# Patient Record
Sex: FEMALE | Race: WHITE | Employment: STUDENT | URBAN - METROPOLITAN AREA
[De-identification: names, ages, dates, MRNs, and addresses within clinical notes are randomized per-mention and may not be internally consistent; named-entity substitution may affect disease eponyms.]

---

## 2019-01-30 ENCOUNTER — HOSPITAL ENCOUNTER (EMERGENCY)
Facility: CLINIC | Age: 24
Discharge: HOME | End: 2019-01-30
Attending: EMERGENCY MEDICINE | Admitting: EMERGENCY MEDICINE
Payer: COMMERCIAL

## 2019-01-30 VITALS
SYSTOLIC BLOOD PRESSURE: 122 MMHG | OXYGEN SATURATION: 98 % | DIASTOLIC BLOOD PRESSURE: 84 MMHG | RESPIRATION RATE: 16 BRPM | HEART RATE: 75 BPM | TEMPERATURE: 98.2 F

## 2019-01-30 DIAGNOSIS — T78.40XA ALLERGIC REACTION, INITIAL ENCOUNTER: ICD-10-CM

## 2019-01-30 PROCEDURE — 99284 EMERGENCY DEPT VISIT MOD MDM: CPT | Mod: Z6 | Performed by: EMERGENCY MEDICINE

## 2019-01-30 PROCEDURE — 99283 EMERGENCY DEPT VISIT LOW MDM: CPT | Performed by: EMERGENCY MEDICINE

## 2019-01-30 PROCEDURE — 25000125 ZZHC RX 250: Performed by: EMERGENCY MEDICINE

## 2019-01-30 PROCEDURE — 25000132 ZZH RX MED GY IP 250 OP 250 PS 637: Performed by: EMERGENCY MEDICINE

## 2019-01-30 RX ORDER — EPINEPHRINE 0.3 MG/.3ML
0.3 INJECTION SUBCUTANEOUS
Qty: 0.3 ML | Refills: 0 | Status: SHIPPED | OUTPATIENT
Start: 2019-01-30 | End: 2019-03-01

## 2019-01-30 RX ORDER — FAMOTIDINE 20 MG/1
20 TABLET, FILM COATED ORAL 2 TIMES DAILY
Qty: 20 TABLET | Refills: 0 | Status: SHIPPED | OUTPATIENT
Start: 2019-01-30 | End: 2019-02-12

## 2019-01-30 RX ORDER — DIPHENHYDRAMINE HCL 25 MG
25 CAPSULE ORAL EVERY 6 HOURS PRN
Qty: 20 CAPSULE | Refills: 0 | Status: SHIPPED | OUTPATIENT
Start: 2019-01-30 | End: 2019-02-04

## 2019-01-30 RX ORDER — CETIRIZINE HYDROCHLORIDE 10 MG/1
10 TABLET ORAL DAILY
Qty: 10 TABLET | Refills: 0 | Status: SHIPPED | OUTPATIENT
Start: 2019-01-30 | End: 2019-02-12

## 2019-01-30 RX ORDER — DIPHENHYDRAMINE HCL 50 MG
50 CAPSULE ORAL ONCE
Status: COMPLETED | OUTPATIENT
Start: 2019-01-30 | End: 2019-01-30

## 2019-01-30 RX ORDER — CETIRIZINE HYDROCHLORIDE 10 MG/1
10 TABLET ORAL DAILY
Status: DISCONTINUED | OUTPATIENT
Start: 2019-01-30 | End: 2019-01-30 | Stop reason: HOSPADM

## 2019-01-30 RX ORDER — PREDNISONE 20 MG/1
40 TABLET ORAL ONCE
Status: COMPLETED | OUTPATIENT
Start: 2019-01-30 | End: 2019-01-30

## 2019-01-30 RX ORDER — PREDNISONE 20 MG/1
40 TABLET ORAL DAILY
Qty: 10 TABLET | Refills: 0 | Status: SHIPPED | OUTPATIENT
Start: 2019-01-30 | End: 2019-02-12

## 2019-01-30 RX ADMIN — DIPHENHYDRAMINE HYDROCHLORIDE 50 MG: 50 CAPSULE ORAL at 11:55

## 2019-01-30 RX ADMIN — PREDNISONE 40 MG: 20 TABLET ORAL at 11:54

## 2019-01-30 RX ADMIN — CETIRIZINE HYDROCHLORIDE 10 MG: 10 TABLET, FILM COATED ORAL at 12:20

## 2019-01-30 RX ADMIN — RANITIDINE 150 MG: 150 TABLET ORAL at 11:54

## 2019-01-30 ASSESSMENT — ENCOUNTER SYMPTOMS
SORE THROAT: 0
SHORTNESS OF BREATH: 1
EYE ITCHING: 1
TROUBLE SWALLOWING: 0
SPEECH DIFFICULTY: 0
VOICE CHANGE: 1
COUGH: 0
FACIAL SWELLING: 1
NAUSEA: 0
WHEEZING: 0
VOMITING: 0

## 2019-01-30 NOTE — ED TRIAGE NOTES
Pt presents to ED for a complaint of an allergic reaction. Pt first noticed a rash on her face on Monday, that wasn't improving. Pt took some benadryl with minimal relief. This pt started noticing some swelling and tingling on her lips and that she was having a raspy voice with some shortness of breath so she decided to come to the ED.

## 2019-01-30 NOTE — ED AVS SNAPSHOT
[facility], [place], Emergency Department  [address] [place] [id number]  Phone:  [phone]                                    Marjorie Pastor   MRN: [id number]    Department:  [facility], Emergency Department   Date of Visit:  1/30/2019           After Visit Summary Signature Page    I have received my discharge instructions, and my questions have been answered. I have discussed any challenges I see with this plan with the nurse or doctor.    ..........................................................................................................................................  Patient/Patient Representative Signature      ..........................................................................................................................................  Patient Representative Print Name and Relationship to Patient    ..................................................               ................................................  Date                                   Time    ..........................................................................................................................................  Reviewed by Signature/Title    ...................................................              ..............................................  Date                                               Time          22EPIC Rev 08/18

## 2019-01-30 NOTE — DISCHARGE INSTRUCTIONS
Please make an appointment to follow up with [facility] (phone: [phone] and [facility] (phone: [phone]) in 5-7 days. You may request a referral to an allergy specialist.    Take the Zyrtec, Benadryl, Pepcid, prednisone, as prescribed to help with allergic symptoms.    Use the epinephrine pen as a rescue medication in case her symptoms rapidly worsen and you develop difficulty breathing or low blood pressure.  You should call 911 and come to the emergency department immediately, if you give yourself an epinephrine injection.    Try to eliminate exposure to any possible allergens.  Follow-up with your primary care provider for reassessment and referral to an allergy specialist.

## 2019-01-30 NOTE — ED PROVIDER NOTES
History     Chief Complaint   Patient presents with     Allergic Reaction     The history is provided by the patient and medical records.     Marjorie Pastor is a 23 year old otherwise healthy female who presents to the Emergency Department for evaluation of an allergic reaction. Patient reports that she developed tovar-like bumps on her cheeks on Monday, 2 days ago. She took 4 Benadryl without improvement. Tuesday, she states that she put cortisone cream on her face and was feeling a bit better, but after a shower at 6pm, the rash spread to her neck. She took 1 Zyrtec with no improvement. Overnight, she developed swollen lips and throat tightness. She called a nurse line this morning and was told to visit the ED. Here, patient complains of shortness of breath from her throat tightness and swollen lips. She also reports that she put some olive oil on her rashes earlier today and has been feeling better since. She reports a history of allergies to apple pesticides, which gives her an itchy throat and nose as well as teary eyes. She denies feeling sick. She denies a sore throat.     History reviewed. No pertinent past medical history.    History reviewed. No pertinent surgical history.    No family history on file.    Social History     Tobacco Use     Smoking status: Not on file   Substance Use Topics     Alcohol use: Not on file       Current Facility-Administered Medications   Medication     cetirizine (zyrTEC) tablet 10 mg     Current Outpatient Medications   Medication     cetirizine (ZYRTEC) 10 MG tablet     diphenhydrAMINE (BENADRYL ALLERGY) 25 MG capsule     EPINEPHrine (EPIPEN/ADRENACLICK/OR ANY BX GENERIC EQUIV) 0.3 MG/0.3ML injection 2-pack     famotidine (PEPCID) 20 MG tablet     predniSONE (DELTASONE) 20 MG tablet      No Known Allergies    I have reviewed the Medications, Allergies, Past Medical and Surgical History, and Social History in the Epic system.    Review of Systems   HENT: Positive for  facial swelling and voice change. Negative for sore throat and trouble swallowing.         Positive for throat tightness  Positive for swollen lips   Eyes: Positive for itching.   Respiratory: Positive for shortness of breath. Negative for cough and wheezing.    Cardiovascular: Negative for chest pain.   Gastrointestinal: Negative for nausea and vomiting.   Skin: Positive for rash (Cheeks and neck).   Allergic/Immunologic: Positive for environmental allergies and food allergies.   Neurological: Negative for speech difficulty.   All other systems reviewed and are negative.      Physical Exam   BP: 125/88  Pulse: 75  Heart Rate: 79  Temp: 98.2  F (36.8  C)  Resp: 16  SpO2: 99 %      Physical Exam   Constitutional: She is oriented to person, place, and time. She appears well-developed and well-nourished. No distress.   HENT:   Head: Normocephalic and atraumatic. Head is without right periorbital erythema and without left periorbital erythema.   Nose: Nose normal. No mucosal edema or rhinorrhea.   Mouth/Throat: Uvula is midline, oropharynx is clear and moist and mucous membranes are normal. No oral lesions. No trismus in the jaw. No uvula swelling. No oropharyngeal exudate, posterior oropharyngeal edema or posterior oropharyngeal erythema.   Eyes: Conjunctivae, EOM and lids are normal. Pupils are equal, round, and reactive to light. Right conjunctiva is not injected. Left conjunctiva is not injected. No scleral icterus.   Neck: Normal range of motion. Neck supple.   Cardiovascular: Normal rate, regular rhythm, normal heart sounds and intact distal pulses.   No murmur heard.  Pulmonary/Chest: Effort normal and breath sounds normal. No stridor. No respiratory distress. She has no wheezes. She has no rales. She exhibits no tenderness.   Abdominal: Soft. She exhibits no distension. There is no tenderness. There is no guarding.   Musculoskeletal: Normal range of motion. She exhibits no edema, tenderness or deformity.    Neurological: She is alert and oriented to person, place, and time. She exhibits normal muscle tone.   Skin: Skin is warm and dry. Rash noted. Rash is papular. She is not diaphoretic. No erythema. No pallor.   There is a fine micropapular rash diffusely on the face.  Papules are mainly flesh-colored but some are erythematous.   Psychiatric: She has a normal mood and affect. Her behavior is normal. Thought content normal.   Nursing note and vitals reviewed.      ED Course   11:23 AM  The patient was seen and examined by Lala Terry MD in Room ED18.        Procedures             Critical Care time:  none             Labs Ordered and Resulted from Time of ED Arrival Up to the Time of Departure from the ED - No data to display         Assessments & Plan (with Medical Decision Making)   Marjorie Pastor is a 23 year old otherwise healthy female who presents to the Emergency Department for evaluation of an allergic reaction.     Ddx: angioedema, anaphylactoid reaction, early anaphylaxis, environmental allergies    Patient does not have any objective findings of angioedema on exam.  But she does complain of lip tingling and numbness.  She also believes that her voice sounds hoarse but denies URI symptoms.  Vocalizations sounds normal to me.  Also there is a mild papular rash on her face.  It does not look consistent with urticaria.  No evidence of airway compromise or lower airway disease.  Vitals are normal.  Patient given symptomatic management with Benadryl, Zyrtec, Zantac, prednisone.  Monitored in the emergency department without progressive symptoms.  Reports that the sensation of tightness in her throat has improved.  We discussed ongoing treatment with antihistamines and steroids for the next 5 days.  Follow-up with primary care doctor for referral to allergy specialist.  Patient also given an EpiPen prescription to use in case she develops rapid airway swelling or recurrent severe symptoms.  She feels  comfortable with discharge home.     I have reviewed the nursing notes.    I have reviewed the findings, diagnosis, plan and need for follow up with the patient.       Medication List      Started    cetirizine 10 MG tablet  Commonly known as:  zyrTEC  10 mg, Oral, DAILY     diphenhydrAMINE 25 MG capsule  Commonly known as:  BENADRYL ALLERGY  25 mg, Oral, EVERY 6 HOURS PRN     EPINEPHrine 0.3 MG/0.3ML injection 2-pack  Commonly known as:  EPIPEN/ADRENACLICK/or ANY BX GENERIC EQUIV  0.3 mg, Intramuscular, ONCE PRN     famotidine 20 MG tablet  Commonly known as:  PEPCID  20 mg, Oral, 2 TIMES DAILY     predniSONE 20 MG tablet  Commonly known as:  DELTASONE  40 mg, Oral, DAILY            Final diagnoses:   Allergic reaction, initial encounter     I, Adrian Angulo, am serving as a trained medical scribe to document services personally performed by Lala Terry MD, based on the provider's statements to me.   ILala MD, was physically present and have reviewed and verified the accuracy of this note documented by Adrian Angulo.    1/30/2019   [facility], [place], EMERGENCY DEPARTMENT     Lala Terry MD  01/30/19 2136

## 2019-02-12 ENCOUNTER — HOSPITAL ENCOUNTER (EMERGENCY)
Facility: CLINIC | Age: 24
Discharge: HOME | End: 2019-02-12
Attending: EMERGENCY MEDICINE | Admitting: EMERGENCY MEDICINE
Payer: COMMERCIAL

## 2019-02-12 VITALS
HEART RATE: 86 BPM | TEMPERATURE: 98.4 F | SYSTOLIC BLOOD PRESSURE: 120 MMHG | DIASTOLIC BLOOD PRESSURE: 82 MMHG | RESPIRATION RATE: 16 BRPM | WEIGHT: 181.66 LBS | OXYGEN SATURATION: 100 %

## 2019-02-12 DIAGNOSIS — T78.40XA ALLERGIC REACTION, INITIAL ENCOUNTER: ICD-10-CM

## 2019-02-12 PROCEDURE — 99282 EMERGENCY DEPT VISIT SF MDM: CPT | Performed by: EMERGENCY MEDICINE

## 2019-02-12 PROCEDURE — 99284 EMERGENCY DEPT VISIT MOD MDM: CPT | Mod: Z6 | Performed by: EMERGENCY MEDICINE

## 2019-02-12 RX ORDER — PREDNISONE 20 MG/1
TABLET ORAL
Qty: 23 TABLET | Refills: 0 | Status: SHIPPED | OUTPATIENT
Start: 2019-02-12

## 2019-02-12 RX ORDER — FAMOTIDINE 20 MG/1
20 TABLET, FILM COATED ORAL 3 TIMES DAILY
Qty: 90 TABLET | Refills: 0 | Status: SHIPPED | OUTPATIENT
Start: 2019-02-12 | End: 2019-03-14

## 2019-02-12 SDOH — HEALTH STABILITY: MENTAL HEALTH: HOW OFTEN DO YOU HAVE A DRINK CONTAINING ALCOHOL?: NEVER

## 2019-02-12 ASSESSMENT — ENCOUNTER SYMPTOMS
COLOR CHANGE: 0
EYE REDNESS: 0
ABDOMINAL PAIN: 0
ARTHRALGIAS: 0
FEVER: 0
CONFUSION: 0
DIFFICULTY URINATING: 0
SLEEP DISTURBANCE: 1
APPETITE CHANGE: 1
HEADACHES: 1
NECK STIFFNESS: 0
SHORTNESS OF BREATH: 1

## 2019-02-12 NOTE — ED AVS SNAPSHOT
[facility], [place], Emergency Department  [address] [place] [address]  Phone:  [phone]                                    Marjorie Pastor   MRN: [id number]    Department:  [facility], Emergency Department   Date of Visit:  2/12/2019           After Visit Summary Signature Page    I have received my discharge instructions, and my questions have been answered. I have discussed any challenges I see with this plan with the nurse or doctor.    ..........................................................................................................................................  Patient/Patient Representative Signature      ..........................................................................................................................................  Patient Representative Print Name and Relationship to Patient    ..................................................               ................................................  Date                                   Time    ..........................................................................................................................................  Reviewed by Signature/Title    ...................................................              ..............................................  Date                                               Time          22EPIC Rev 08/18

## 2019-02-12 NOTE — ED PROVIDER NOTES
"  History     Chief Complaint   Patient presents with     Pruritis     The history is provided by the patient and medical records.     Marjorie Pastor is a 23 year old female who presents to the Emergency Department for evaluation of pruritis.  The patient reports that approximately 1.5 weeks ago she experienced a red rash and \"white heads\" around her mouth. She reports that her rash began spreading throughout the right side of body.  The patient states that she noticed having continued reactions after showering, and applying a keratin treatment to her hair, and believes this may be the source of her reaction.  The patient endorses changing her shampoo, however, she is still symptomatic.  Today the patient complains of headaches, itching and burning skin, shortness of breath, reduced appetite, reduced sleep, and chest pain.  The patient denies being sick in the last month.  The patient endorses utilizing cortizone cream regularly to manage her symptoms.    I have reviewed the Medications, Allergies, Past Medical and Surgical History, and Social History in the Epic system.    History reviewed. No pertinent past medical history.    History reviewed. No pertinent surgical history.    History reviewed. No pertinent family history.    Social History     Tobacco Use     Smoking status: Never Smoker     Smokeless tobacco: Never Used   Substance Use Topics     Alcohol use: Yes     Frequency: Never       No current facility-administered medications for this encounter.      Current Outpatient Medications   Medication     famotidine (PEPCID) 20 MG tablet     predniSONE (DELTASONE) 20 MG tablet     EPINEPHrine (EPIPEN/ADRENACLICK/OR ANY BX GENERIC EQUIV) 0.3 MG/0.3ML injection 2-pack      No Known Allergies    Review of Systems   Constitutional: Positive for appetite change (reduced). Negative for fever.   HENT: Negative for congestion.    Eyes: Negative for redness.   Respiratory: Positive for shortness of breath.  " "  Cardiovascular: Negative for chest pain.   Gastrointestinal: Negative for abdominal pain.   Genitourinary: Negative for difficulty urinating.   Musculoskeletal: Negative for arthralgias and neck stiffness.   Skin: Negative for color change.        Positive for pruritis  Positive for \"burning sensation\"   Neurological: Positive for headaches.   Psychiatric/Behavioral: Positive for sleep disturbance. Negative for confusion.       Physical Exam   BP: 122/83  Pulse: 86  Heart Rate: 83  Temp: 98.4  F (36.9  C)  Resp: 16  Weight: 82.4 kg (181 lb 10.5 oz)  SpO2: 97 %      Physical Exam   Constitutional: No distress.   HENT:   Head: Atraumatic.   Mouth/Throat: Oropharynx is clear and moist. No uvula swelling. No oropharyngeal exudate.   Eyes: Pupils are equal, round, and reactive to light. No scleral icterus.   Cardiovascular: Normal heart sounds and intact distal pulses.   Pulmonary/Chest: Breath sounds normal. No respiratory distress.   Abdominal: Soft. Bowel sounds are normal. She exhibits no distension. There is no tenderness.   Musculoskeletal: She exhibits no edema or tenderness.   Skin: Skin is warm. Rash noted. Rash is urticarial (small uritcarial lesions on upper chest.). She is not diaphoretic.   No rash on palms or soles or in the mouth.  Positive dermatographia on her back.       ED Course   9:26 AM  The patient was seen and examined by Dr. Figueroa in Room ED22.        Procedures             Critical Care time:  none             Labs Ordered and Resulted from Time of ED Arrival Up to the Time of Departure from the ED - No data to display         Assessments & Plan (with Medical Decision Making)   The patient has intermittent urticaria likely brought on to exposure to a new hair treatment.  Despite a course of prednisone which did improve her symptoms she is not back to baseline and has significant dermatographia.  She is quite somnolent on Benadryl and Zyrtec.  We talked about different treatment options " and I think she would do best with continuing her H2 blocker, switching to Claritin, and doing a short course of steroids with taper until her immune system is calmed down.  She will work with Buddy to get a referral to an allergist and return if she develops any new symptoms.  At this time she has no mucous membrane or respiratory symptoms.    I have reviewed the nursing notes.    I have reviewed the findings, diagnosis, plan and need for follow up with the patient.       Medication List      Modified    famotidine 20 MG tablet  Commonly known as:  PEPCID  20 mg, Oral, 3 TIMES DAILY  What changed:  when to take this     predniSONE 20 MG tablet  Commonly known as:  DELTASONE  40 mg/day for 3 days, 20 mg/day for 3 days, then 10 mg/day for 5 days  What changed:      how much to take    how to take this    when to take this    additional instructions        ASK your doctor about these medications    cetirizine 10 MG tablet  Commonly known as:  zyrTEC  10 mg, Oral, DAILY  Ask about: Should I take this medication?     diphenhydrAMINE 25 MG capsule  Commonly known as:  BENADRYL ALLERGY  25 mg, Oral, EVERY 6 HOURS PRN  Ask about: Should I take this medication?            Final diagnoses:   Allergic reaction, initial encounter   IEzekiel, am serving as a trained medical scribe to document services personally performed by Chino Figueroa MD, based on the provider's statements to me.      Chino RUSSELL MD, was physically present and have reviewed and verified the accuracy of this note documented by Ezekiel Chun.     2/12/2019   [facility], [place], EMERGENCY DEPARTMENT     Chino Figueroa MD  02/14/19 1033

## 2019-02-12 NOTE — ED TRIAGE NOTES
Pt presents ambulatory to triage from home. Pt states was seen last week for allergic reaction and given oral medications to tx. Pt states continues to have s/sx of itchy rash throughout body. Pt also c/o of uri s/sx. No rash noted in triage at face, chest or arms. No swelling noted. Pt does not appear in distress.

## 2019-02-12 NOTE — DISCHARGE INSTRUCTIONS
Please make an appointment to follow up with [facility] (phone: [phone]) as soon as possible for referral to allergist.    Use Claritin or Zyrtec daily.    Switch to hypoallergenic shampoo, soaps and laundry detergent.  Avoid any triggering foods.    Return if any trouble breathing or swallowing.

## 2019-02-12 NOTE — LETTER
February 12, 2019      To Whom It May Concern:      Marjorie Pastor was seen in our Emergency Department today, 02/12/19.  I expect her condition to improve over the next day.  She may return to work/school when improved.    Sincerely,        Chino Figueroa MD

## 2019-02-14 ENCOUNTER — HOSPITAL ENCOUNTER (EMERGENCY)
Facility: CLINIC | Age: 24
Discharge: HOME | End: 2019-02-14
Attending: EMERGENCY MEDICINE | Admitting: EMERGENCY MEDICINE
Payer: COMMERCIAL

## 2019-02-14 VITALS
TEMPERATURE: 98.2 F | HEART RATE: 73 BPM | RESPIRATION RATE: 18 BRPM | DIASTOLIC BLOOD PRESSURE: 87 MMHG | WEIGHT: 181 LBS | SYSTOLIC BLOOD PRESSURE: 121 MMHG | BODY MASS INDEX: 27.43 KG/M2 | OXYGEN SATURATION: 97 % | HEIGHT: 68 IN

## 2019-02-14 DIAGNOSIS — R21 RASH: ICD-10-CM

## 2019-02-14 DIAGNOSIS — T78.40XA ALLERGIC REACTION, INITIAL ENCOUNTER: ICD-10-CM

## 2019-02-14 PROCEDURE — 99283 EMERGENCY DEPT VISIT LOW MDM: CPT | Mod: Z6 | Performed by: EMERGENCY MEDICINE

## 2019-02-14 PROCEDURE — 99282 EMERGENCY DEPT VISIT SF MDM: CPT | Performed by: EMERGENCY MEDICINE

## 2019-02-14 ASSESSMENT — ENCOUNTER SYMPTOMS
SORE THROAT: 0
BACK PAIN: 0
HEADACHES: 0
ABDOMINAL PAIN: 0
BRUISES/BLEEDS EASILY: 0
SHORTNESS OF BREATH: 0
FEVER: 0
CONFUSION: 0

## 2019-02-14 ASSESSMENT — MIFFLIN-ST. JEOR: SCORE: 1634.51

## 2019-02-14 NOTE — DISCHARGE INSTRUCTIONS
Thank you for your patience today. Please follow up at your appointment tomorrow with the allergist special and with your regular doctor in 1-2 days for follow up and further evaluation. Please call to make an appointment. Please take Benadryl 1-2 tablets every 6 hours as needed for itching or hives. Please take Steroids and famotidine as prescribed. This will help with the allergic reaction and inflammation. Please use EPI pen as needed in the future if you have a severe allergic reaction (difficulty breathing, chest pain, throat closing, tongue swelling) after a bee sting or other allergen.  Please keep an EPI pen with your at all times. (work, home, car). You may also try an over the counter steroid or benadryl cream to help with discomfort. Please avoid trigger. Please return to the ER if you develop difficulty breathing or talking, chest pain, or any worsening of your current condition. It was a pleasure taking care of you today. We hope you feel better soon

## 2019-02-14 NOTE — ED AVS SNAPSHOT
[facility], [place], Emergency Department  [address] [place] [id number]  Phone:  [phone]                                    Marjorie Pastor   MRN: [id number]    Department:  [facility], Emergency Department   Date of Visit:  2/14/2019           After Visit Summary Signature Page    I have received my discharge instructions, and my questions have been answered. I have discussed any challenges I see with this plan with the nurse or doctor.    ..........................................................................................................................................  Patient/Patient Representative Signature      ..........................................................................................................................................  Patient Representative Print Name and Relationship to Patient    ..................................................               ................................................  Date                                   Time    ..........................................................................................................................................  Reviewed by Signature/Title    ...................................................              ..............................................  Date                                               Time          22EPIC Rev 08/18

## 2019-02-14 NOTE — ED PROVIDER NOTES
[facility] ([facility])  2/14/19  ED 9 1:09 AM   History     Chief Complaint   Patient presents with     Rash     facial swelling     The history is provided by the patient.     Marjorie Pastor is a 23 year old female who presents with persistent skin irritation to her neck and back as well as insomnia for the past 4 days. She had presented to the Emergency Department on 1/30/19 with complaints of tovar like bumps on her cheeks, neck and then subjective sensation that her throat was closing with swollen lips. She had tried cortisone cream and zyrtec at home without improvement. She was seen by Dr. Terry who found fine maculopapular rash diffusely on face. No orofacial edema or erythema. There did not seem to be any findings of angioedema on exam nor was there any hoarse phonation or any airway management issues. She was given benadryl, zyrtec, zantac and prednisone with some improvement to her throat closing sensation.  She was discharged home on prednisone, Benadryl, famotidine 20 mg 2 times daily, prednisone 40 mg once daily, cetirizine 10 mg daily and an EpiPen.  She returned to the ER 2 days ago with ongoing skin irritation sensation.  Her course of prednisone and Pepcid was extended by 5 more days.  She has been compliant with her medication regimen without any meaningful relief.  She has eliminated all of her usual soaps and shampoos and substituting them with more natural organic ones.  She has washed everything she owns 3 times but still has sensation of skin burning.  She believes that the origin of the irritation is from when she had a Brazilian blowout and then washed her hair afterwards.  She states that whenever her hair is wet and comes in contact with her skin, it irritates what of her skin is lying on.  This is making showering problematic for her.  She has been trying to take the Benadryl at night to help her sleep but this has not been effective at either  "controlling her symptoms or helping her fall asleep.  She stopped states that she has not slept in 4 days and is at her wits and.  She does have appointment with an allergist tomorrow and comes here and shared aspiration.  Here she states that she has no red bumps, just a hot sensation to her skin that is absolutely maddening.  She still feels as if there is a hand clamping on her throat, though no real difficulty managing her secretions or speaking. No new cats or dogs.  She states that her eyelids swollen but she is crying.  She is on nuvaring, no other medications.     I have reviewed the Medications, Allergies, Past Medical and Surgical History, and Social History in the [facility] system.  History reviewed. No pertinent past medical history.    History reviewed. No pertinent surgical history.    History reviewed. No pertinent family history.    Social History     Tobacco Use     Smoking status: Never Smoker     Smokeless tobacco: Never Used   Substance Use Topics     Alcohol use: Yes     Frequency: Never      Review of Systems   Constitutional: Negative for fever.   HENT: Negative for sore throat.    Eyes: Negative for visual disturbance.   Respiratory: Negative for shortness of breath.    Cardiovascular: Negative for chest pain.   Gastrointestinal: Negative for abdominal pain.   Endocrine: Negative for polyuria.   Musculoskeletal: Negative for back pain.   Skin: Positive for rash.   Allergic/Immunologic: Negative for immunocompromised state.   Neurological: Negative for headaches.   Hematological: Does not bruise/bleed easily.   Psychiatric/Behavioral: Negative for confusion.       Physical Exam   BP: 121/87  Pulse: 73  Temp: 98.2  F (36.8  C)  Resp: 18  Height: 172.7 cm (5' 8\")  Weight: 82.1 kg (181 lb)  SpO2: 97 %      Physical Exam  .Constitutional:   well nourished, well developed, resting comfortably   HENT:   Head: Normocephalic and atraumatic.   Eyes: Conjunctivae are normal. Pupils are equal, round, and " reactive to light.  pharynx has no erythema or exudate, mucous membranes are moist, no uvula swelling  Neck:   no adenopathy, no bony tenderness  Cardiovascular: regular rate and rhythm without murmurs or gallops  Pulmonary/Chest: Clear to auscultation bilaterally, with no wheezes or retractions. No respiratory distress.  GI: Soft with good bowel sounds.  Non-tender, non-distended, with no guarding, no rebound, no peritoneal signs.   Back:  No bony or CVA tenderness   Musculoskeletal:  no edema or clubbing   Skin: Skin is warm and dry. Mild maculopapular rash/erythema to right side of neck and back, no evidence of hives (describes as a burning sensation/irritation)   Neurological: alert and oriented to person, place, and time. Nonfocal exam  Psychiatric:  normal mood and affect.     ED Course        Procedures  No results found for this or any previous visit (from the past 24 hour(s)).  Medications - No data to display      Assessments & Plan (with Medical Decision Making)   Marjorie Pastor is a 23 year old female who presents with persistent skin irritation to her neck and back as well as insomnia for the past 4 days.  Upon arrival patient is well-appearing, afebrile, no distress.  Patient hemodynamically stable, denies any chest pain or shortness of breath.  On examination patient with a mild maculopapular rash/erythema to the right side of her neck, back.  No evidence of hives.  Patient describes this as a burning sensation/irritation.  Patient reports her symptoms have been present for the past 2 weeks which started after having a hair treatment.  Patient reports symptoms seem to be worse after showering and washing her hair despite changing her shampoos up.  Patient has been to the emergency department twice and is currently taking Pepcid, Benadryl, prednisone to help with the symptoms.  She reports the symptoms are worse at night after she showers and causes her to not sleep.  Patient has an appointment with  allergist in the morning. Patient is tearful and frustrated.  I had a long discussion with the patient and understand her frustrations.  Clinically at this time patient has no symptoms of hemodynamic compromise, no wheezing, shortness of breath, chest pain, throat swelling.  I reassured patient that she is doing the right things with continuing the Pepcid, Benadryl, prednisone.  This time I would not add any more medications to patient's regimen.  I did suggest that she may try an over-the-counter Benadryl cream to help with the burning sensation.  Strongly encourage patient to follow-up with allergist for further evaluation and continue to avoid any triggers.  At this time no emergent need for hospitalization.  Discussed with patient return if throat swelling, chest pain, shortness of breath, wheezing, or any worsening or symptoms.  Patient has an EpiPen in case he symptoms worsen.  Patient understands and agrees the plan.        I have reviewed the nursing notes.    I have reviewed the findings, diagnosis, plan and need for follow up with the patient.        Final diagnoses:   Allergic reaction, initial encounter   Rash   I, Kimberly Eavns, am serving as a trained medical scribe to document services personally performed by Jessica Smiley MD based on the provider's statements to me on February 14, 2019.  This document has been checked and approved by the attending provider.    I, Jessica Smiley MD, was physically present and have reviewed and verified the accuracy of this note documented by Kimberly Evans, medical scribe.     2/14/2019   [facility], [place], EMERGENCY DEPARTMENT     Jessica Smiley MD  02/14/19 [id number]

## 2019-02-14 NOTE — ED TRIAGE NOTES
Pt. Reports starting approx. 10 days ago, she noticed facial swelling, rash, hives, and burning sensation on skin. No SOB reported at this time.

## 2020-03-10 ENCOUNTER — HEALTH MAINTENANCE LETTER (OUTPATIENT)
Age: 25
End: 2020-03-10

## 2020-12-27 ENCOUNTER — HEALTH MAINTENANCE LETTER (OUTPATIENT)
Age: 25
End: 2020-12-27

## 2021-04-24 ENCOUNTER — HEALTH MAINTENANCE LETTER (OUTPATIENT)
Age: 26
End: 2021-04-24

## 2021-10-09 ENCOUNTER — HEALTH MAINTENANCE LETTER (OUTPATIENT)
Age: 26
End: 2021-10-09

## 2022-05-16 ENCOUNTER — HEALTH MAINTENANCE LETTER (OUTPATIENT)
Age: 27
End: 2022-05-16

## 2022-09-11 ENCOUNTER — HEALTH MAINTENANCE LETTER (OUTPATIENT)
Age: 27
End: 2022-09-11

## 2023-06-03 ENCOUNTER — HEALTH MAINTENANCE LETTER (OUTPATIENT)
Age: 28
End: 2023-06-03